# Patient Record
Sex: MALE | Race: BLACK OR AFRICAN AMERICAN | NOT HISPANIC OR LATINO | URBAN - METROPOLITAN AREA
[De-identification: names, ages, dates, MRNs, and addresses within clinical notes are randomized per-mention and may not be internally consistent; named-entity substitution may affect disease eponyms.]

---

## 2024-09-09 ENCOUNTER — EMERGENCY (EMERGENCY)
Facility: HOSPITAL | Age: 24
LOS: 1 days | Discharge: HOME | End: 2024-09-09
Admitting: EMERGENCY MEDICINE

## 2024-09-09 VITALS
RESPIRATION RATE: 15 BRPM | HEART RATE: 99 BPM | OXYGEN SATURATION: 97 % | DIASTOLIC BLOOD PRESSURE: 82 MMHG | TEMPERATURE: 98 F | SYSTOLIC BLOOD PRESSURE: 122 MMHG

## 2024-09-09 DIAGNOSIS — R07.89 OTHER CHEST PAIN: ICD-10-CM

## 2024-09-09 DIAGNOSIS — Z76.0 ENCOUNTER FOR ISSUE OF REPEAT PRESCRIPTION: ICD-10-CM

## 2024-09-09 DIAGNOSIS — Z91.018 ALLERGY TO OTHER FOODS: ICD-10-CM

## 2024-09-09 DIAGNOSIS — J45.909 UNSPECIFIED ASTHMA, UNCOMPLICATED: ICD-10-CM

## 2024-09-09 PROCEDURE — 99284 EMERGENCY DEPT VISIT MOD MDM: CPT

## 2024-09-09 RX ORDER — EPINEPHRINE 0.3 MG/.3ML
0.3 INJECTION INTRAMUSCULAR; SUBCUTANEOUS
Qty: 1 | Refills: 0
Start: 2024-09-09

## 2024-09-09 RX ADMIN — Medication 2 PUFF(S): at 02:28

## 2024-09-09 NOTE — ED ADULT TRIAGE NOTE - CHIEF COMPLAINT QUOTE
Pt brought in by EMS stating "I had an asthma attack. I ran out of my albuterol." Pt given 1 combivent treatment by EMS and states "I feel back to normal."

## 2024-09-09 NOTE — ED PROVIDER NOTE - CLINICAL SUMMARY MEDICAL DECISION MAKING FREE TEXT BOX
Patient with mild asthma attack now completely resolved.  We will ensure patient is able to travel home with an albuterol pump.  Pump given as opposed to giving a prescription so patient can have medication now.  Pulmonary exam is completely benign.  We will also refill patient's EpiPen and instructed on importance of carrying it around with him especially given possible exposure to shellfish.  Emergent return precautions discussed.  Patient demonstrates full understanding and is happy with discharge plan.

## 2024-09-09 NOTE — ED PROVIDER NOTE - NSFOLLOWUPINSTRUCTIONS_ED_ALL_ED_FT
-PLEASE FOLLOW-UP WITH YOUR PRIMARY CARE DOCTOR IN 1-2 DAYS.  BRING ALL PAPERWORK FROM TODAY'S VISIT TO YOUR FOLLOW-UP VISIT.    -TAKE ALBUTEROL PUMP 2 PUFFS EVERY 4 HOURS FOR SHORTNESS OF BREATH/COUGH/WHEEZING.   -PLEASE GO TO YOUR PHARMACY TO FILL YOUR PRESCRIPTIONS.  PLEASE START TODAY AND USE AS DIRECTED.  -PLEASE RETURN TO THE ER IMMEDIATELY OR CALL 911 FOR ANY HIGH FEVER, TROUBLE BREATHING, VOMITING, SEVERE PAIN, OR ANY OTHER CONCERNS.

## 2024-09-09 NOTE — ED PROVIDER NOTE - PATIENT PORTAL LINK FT
You can access the FollowMyHealth Patient Portal offered by [facility] by registering at the following website: [email or web address]. By joining [facility] FollowMyHealth portal, you will also be able to view your health information using other applications (apps) compatible with our system.

## 2024-09-09 NOTE — ED PROVIDER NOTE - OBJECTIVE STATEMENT
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  Patient reports while at work this evening he started having mild symptoms consistent with an asthma attack.  He reports some chest tightness and coughing.  He noticed that he ran out of his albuterol pump.  He finished work and got concerned because he has a 2-hour commute home to [place] and did not want to do it without an albuterol pump.  He presents via EMS.  EMS gave neb treatment and he feels much better.  Reports all symptoms resolved and he feels back to baseline.  Requesting albuterol pump.    Patient also reports that he does not currently have a prescription for an EpiPen despite his anaphylactic reaction to shellfish.  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  We will refill his prescription.